# Patient Record
Sex: MALE | Race: WHITE | NOT HISPANIC OR LATINO | URBAN - METROPOLITAN AREA
[De-identification: names, ages, dates, MRNs, and addresses within clinical notes are randomized per-mention and may not be internally consistent; named-entity substitution may affect disease eponyms.]

---

## 2019-06-05 ENCOUNTER — CONSULT (OUTPATIENT)
Dept: PLASTIC SURGERY | Facility: HOSPITAL | Age: 74
End: 2019-06-05
Payer: MEDICARE

## 2019-06-05 VITALS — WEIGHT: 247.2 LBS | HEIGHT: 63 IN | BODY MASS INDEX: 43.8 KG/M2

## 2019-06-05 DIAGNOSIS — L81.8 ATYPICAL MELANOCYTIC HYPERPLASIA: Primary | ICD-10-CM

## 2019-06-05 PROCEDURE — 99204 OFFICE O/P NEW MOD 45 MIN: CPT | Performed by: PHYSICIAN ASSISTANT

## 2019-06-05 RX ORDER — ROSUVASTATIN CALCIUM 40 MG/1
40 TABLET, COATED ORAL DAILY
COMMUNITY

## 2019-06-05 RX ORDER — FUROSEMIDE 20 MG/1
20 TABLET ORAL 2 TIMES DAILY
COMMUNITY

## 2019-06-05 RX ORDER — CLOPIDOGREL BISULFATE 75 MG/1
75 TABLET ORAL DAILY
COMMUNITY

## 2019-06-05 RX ORDER — SPIRONOLACTONE 50 MG/1
50 TABLET, FILM COATED ORAL DAILY
COMMUNITY

## 2019-06-05 RX ORDER — RANOLAZINE 500 MG/1
500 TABLET, EXTENDED RELEASE ORAL 2 TIMES DAILY
COMMUNITY

## 2019-06-05 RX ORDER — METOPROLOL SUCCINATE 25 MG/1
25 TABLET, EXTENDED RELEASE ORAL 2 TIMES DAILY
COMMUNITY

## 2019-06-05 RX ORDER — CHOLECALCIFEROL (VITAMIN D3) 25 MCG
TABLET,CHEWABLE ORAL
COMMUNITY

## 2019-06-05 RX ORDER — PETROLATUM,WHITE/LANOLIN
OINTMENT (GRAM) TOPICAL
COMMUNITY

## 2019-06-07 PROBLEM — L81.8 OTHER SPECIFIED DISORDERS OF PIGMENTATION: Status: ACTIVE | Noted: 2019-06-07

## 2019-07-12 ENCOUNTER — APPOINTMENT (OUTPATIENT)
Dept: LAB | Facility: HOSPITAL | Age: 74
End: 2019-07-12
Attending: SURGERY
Payer: MEDICARE

## 2019-07-12 ENCOUNTER — HOSPITAL ENCOUNTER (OUTPATIENT)
Dept: NON INVASIVE DIAGNOSTICS | Facility: HOSPITAL | Age: 74
Discharge: HOME | End: 2019-07-12
Attending: SURGERY
Payer: MEDICARE

## 2019-07-12 DIAGNOSIS — L81.8 OTHER SPECIFIED DISORDERS OF PIGMENTATION: ICD-10-CM

## 2019-07-12 LAB
ANION GAP SERPL CALCULATED.3IONS-SCNC: 8 MMOL/L (ref 4–13)
ATRIAL RATE: 62 BPM
BASOPHILS # BLD AUTO: 0.05 THOUSANDS/ΜL (ref 0–0.1)
BASOPHILS NFR BLD AUTO: 1 % (ref 0–1)
BUN SERPL-MCNC: 14 MG/DL (ref 5–25)
CALCIUM SERPL-MCNC: 9.1 MG/DL (ref 8.3–10.1)
CHLORIDE SERPL-SCNC: 105 MMOL/L (ref 100–108)
CO2 SERPL-SCNC: 26 MMOL/L (ref 21–32)
CREAT SERPL-MCNC: 1.18 MG/DL (ref 0.6–1.3)
EOSINOPHIL # BLD AUTO: 0.27 THOUSAND/ΜL (ref 0–0.61)
EOSINOPHIL NFR BLD AUTO: 4 % (ref 0–6)
ERYTHROCYTE [DISTWIDTH] IN BLOOD BY AUTOMATED COUNT: 12.8 % (ref 11.6–15.1)
GFR SERPL CREATININE-BSD FRML MDRD: 61 ML/MIN/1.73SQ M
GLUCOSE P FAST SERPL-MCNC: 106 MG/DL (ref 65–99)
HCT VFR BLD AUTO: 41.7 % (ref 36.5–49.3)
HGB BLD-MCNC: 13.7 G/DL (ref 12–17)
IMM GRANULOCYTES # BLD AUTO: 0.04 THOUSAND/UL (ref 0–0.2)
IMM GRANULOCYTES NFR BLD AUTO: 1 % (ref 0–2)
LYMPHOCYTES # BLD AUTO: 1.87 THOUSANDS/ΜL (ref 0.6–4.47)
LYMPHOCYTES NFR BLD AUTO: 26 % (ref 14–44)
MCH RBC QN AUTO: 31.9 PG (ref 26.8–34.3)
MCHC RBC AUTO-ENTMCNC: 32.9 G/DL (ref 31.4–37.4)
MCV RBC AUTO: 97 FL (ref 82–98)
MONOCYTES # BLD AUTO: 0.76 THOUSAND/ΜL (ref 0.17–1.22)
MONOCYTES NFR BLD AUTO: 11 % (ref 4–12)
NEUTROPHILS # BLD AUTO: 4.27 THOUSANDS/ΜL (ref 1.85–7.62)
NEUTS SEG NFR BLD AUTO: 57 % (ref 43–75)
NRBC BLD AUTO-RTO: 0 /100 WBCS
P AXIS: 19 DEGREES
PLATELET # BLD AUTO: 194 THOUSANDS/UL (ref 149–390)
PMV BLD AUTO: 10.8 FL (ref 8.9–12.7)
POTASSIUM SERPL-SCNC: 4 MMOL/L (ref 3.5–5.3)
PR INTERVAL: 160 MS
QRS AXIS: 24 DEGREES
QRSD INTERVAL: 80 MS
QT INTERVAL: 432 MS
QTC INTERVAL: 438 MS
RBC # BLD AUTO: 4.3 MILLION/UL (ref 3.88–5.62)
SODIUM SERPL-SCNC: 139 MMOL/L (ref 136–145)
T WAVE AXIS: 35 DEGREES
VENTRICULAR RATE: 62 BPM
WBC # BLD AUTO: 7.26 THOUSAND/UL (ref 4.31–10.16)

## 2019-07-12 PROCEDURE — 93010 ELECTROCARDIOGRAM REPORT: CPT | Performed by: INTERNAL MEDICINE

## 2019-07-12 PROCEDURE — 36415 COLL VENOUS BLD VENIPUNCTURE: CPT

## 2019-07-12 PROCEDURE — 80048 BASIC METABOLIC PNL TOTAL CA: CPT

## 2019-07-12 PROCEDURE — 93005 ELECTROCARDIOGRAM TRACING: CPT

## 2019-07-12 PROCEDURE — 85025 COMPLETE CBC W/AUTO DIFF WBC: CPT

## 2019-07-15 PROCEDURE — NC001 PR NO CHARGE: Performed by: PHYSICIAN ASSISTANT

## 2019-07-15 NOTE — H&P
Assessment/Plan:  Garrett is a pleasant 73-year-old female who presents in consultation for an intra epidermal proliferation of atypical melanocytes of the right nose  She was referred to us by Dr Yemi Doll of [facility]  Please see HPI  The pathology report recommend excision with a margin of healthy tissue to rule out melanoma in situ  I discussed with her excision of the right nose atypical melanocytic lesion with possible flap, bilobed flap verses full-thickness skin graft  She understood and agreed  We discussed with the patient the options, benefits, and risks of surgery such as anesthesia, bleeding, infection, scarring, positive margin and the need for additional procedures  Consent was obtained and all questions answered to her satisfaction  We will plan for surgery at her earliest convenience       Diagnoses and all orders for this visit:     Atypical melanocytic hyperplasia            Subjective:       Patient ID: Makeda Mathur is a 69 y o  male      HPI   She reports having a skin lesion on the right side of her nose over the past several years  Over the last 2 years she has noticed it increased in size  She had the area biopsied by her dermatologist and discovered atypical cells  She denies any bleeding associated with it      The following portions of the patient's history were reviewed and updated as appropriate: He  has no past medical history on file  He  has no past surgical history on file  His family history is not on file  He  has no tobacco, alcohol, and drug history on file        Review of Systems   HENT: Negative for hearing loss  Eyes: Negative for visual disturbance  Respiratory: Negative for shortness of breath  Cardiovascular: Negative for chest pain  Gastrointestinal: Negative for abdominal pain, blood in stool, constipation, diarrhea, nausea and vomiting  Genitourinary: Negative for hematuria  Musculoskeletal: Negative for gait problem     Skin: As per HPI  Neurological: Negative for seizures and headaches  Hematological: Bruises/bleeds easily  Takes ASA and Plavix for cardiac stents  Psychiatric/Behavioral: The patient is not nervous/anxious            Objective:        There were no vitals taken for this visit             Physical Exam   Constitutional: He is oriented to person, place, and time  He appears well-developed and well-nourished  No distress  HENT:   Head: Normocephalic and atraumatic  Eyes: Pupils are equal, round, and reactive to light  EOM are normal  No scleral icterus  Neck: Neck supple  No tracheal deviation present  No thyromegaly present  Cardiovascular: Normal rate and regular rhythm  Exam reveals no gallop and no friction rub  No murmur heard  Pulmonary/Chest: Effort normal and breath sounds normal  He has no wheezes  He has no rales  Abdominal: Soft  Bowel sounds are normal  He exhibits no distension  There is no tenderness  There is no rebound and no guarding  Musculoskeletal: Normal range of motion  Lymphadenopathy:     He has no cervical adenopathy  Neurological: He is alert and oriented to person, place, and time  No cranial nerve deficit  Skin:   Right nose biopsy site noted  It measures approximately 5 mm round  It is located 2 5 cm from the right nasal ala are crease where it meets the nasolabial fold  It is also 4 cm from the right medial canthus  Please see photos  Psychiatric: He has a normal mood and affect

## 2019-07-16 RX ORDER — ISOSORBIDE MONONITRATE 60 MG/1
60 TABLET, EXTENDED RELEASE ORAL DAILY
COMMUNITY

## 2019-07-16 NOTE — PRE-PROCEDURE INSTRUCTIONS
Pre-Surgery Instructions:   Medication Instructions    BABY ASPIRIN PO Patient was instructed by Physician and understands   clopidogrel (PLAVIX) 75 mg tablet Patient was instructed by Physician and understands   Cyanocobalamin (B-12) 1000 MCG CAPS Patient was instructed by Physician and understands   Famotidine (PEPCID AC PO) Instructed patient per Anesthesia Guidelines   furosemide (LASIX) 20 mg tablet Instructed patient per Anesthesia Guidelines   Glucosamine Sulfate 1000 MG CAPS Patient was instructed by Physician and understands   isosorbide mononitrate (IMDUR) 60 mg 24 hr tablet Instructed patient per Anesthesia Guidelines   metoprolol succinate (TOPROL-XL) 25 mg 24 hr tablet Instructed patient per Anesthesia Guidelines   ranolazine (RANEXA) 500 mg 12 hr tablet Instructed patient per Anesthesia Guidelines   rosuvastatin (CRESTOR) 40 MG tablet Instructed patient per Anesthesia Guidelines   spironolactone (ALDACTONE) 50 mg tablet Instructed patient per Anesthesia Guidelines  Before your operation, you play an important role in decreasing your risk for infection by washing with special antiseptic soap  This is an effective way to reduce bacteria on the skin which may help to prevent infections at the surgical site  Please read the following directions in advance  1  In the week before your operation purchase a 4 ounce bottle of antiseptic soap containing chlorhexidine gluconate 4%  Some brand names include: Aplicare, Endure, and Hibiclens  The cost is usually less than $5 00  · For your convenience, the [facility] carries the soap  · It may also be available at your doctor's office or pre-admission testing center, and at most retail pharmacies  · If you are allergic or sensitive to soaps containing chlorhexidine gluconate (CHG), please let your doctor know so another antiseptic soap can be suggested  · CHG antiseptic soap is for external use only    2  The day before your operation follow these directions carefully to get ready  · Place clean lines (sheets) on your bed; you should sleep on clean sheets after your evening shower  · Get clean towels and washcloths ready - you need enough for 2 showers  · Set aside clean underwear, pajamas, and clothing to wear after the shower  Reminders:  · DO NOT use any other soap or body rinse on your skin during or after the antiseptic showers  · DO NOT use lotion , powder, deodorant, or perfume/aftershave of any kind on your skin after your antiseptic shower  · DO NOT shave any body parts in the 24 hours/the day before your operation  · DO NOT get the antiseptic soap in your eyes, ears, nose, mouth, or vaginal area  3      You will need to shower the night before AND the morning of your Surgery  Shower 1:  · The evening before your operation, take the fist shower  · First, shampoo your hair with regular shampoo and rinse it completely before you use the anitseptic soap  After washing and rinsing your hair, rinse your body  · Next, use a clean wash cloth to apply the antiseptic soap and wash your body from the neck down to your toes using 1/2 bottle of the antiseptic soap  You will use the other 1/2 bottle for the second shower  · Clean the area where your incision will be; later this area well for about 2 minutes  · If you ar having head or neck surgery, wash areas with the antiseptic soap  · Rinse yourself completely with running water  · Use a clean towel to dry off  · Wear clean underwear and clothing/pajamas  Shower 2:  · The Morning of your operation, take the second shower following the same steps as Shower 1 using the second 1/2 of the bottle of antiseptic soap  · Use clean cloths and towels to was and dry yourself off  · Wear clean underwear and clothing

## 2019-07-25 ENCOUNTER — ANESTHESIA (OUTPATIENT)
Dept: PERIOP | Facility: HOSPITAL | Age: 74
End: 2019-07-25
Payer: MEDICARE

## 2019-07-25 ENCOUNTER — ANESTHESIA EVENT (OUTPATIENT)
Dept: PERIOP | Facility: HOSPITAL | Age: 74
End: 2019-07-25
Payer: MEDICARE

## 2019-07-25 ENCOUNTER — HOSPITAL ENCOUNTER (OUTPATIENT)
Facility: HOSPITAL | Age: 74
Setting detail: OUTPATIENT SURGERY
Discharge: HOME | End: 2019-07-25
Attending: SURGERY | Admitting: SURGERY
Payer: MEDICARE

## 2019-07-25 VITALS
OXYGEN SATURATION: 96 % | RESPIRATION RATE: 16 BRPM | TEMPERATURE: 97.7 F | BODY MASS INDEX: 43.62 KG/M2 | WEIGHT: 246.2 LBS | HEART RATE: 70 BPM | SYSTOLIC BLOOD PRESSURE: 119 MMHG | DIASTOLIC BLOOD PRESSURE: 66 MMHG | HEIGHT: 63 IN

## 2019-07-25 DIAGNOSIS — L81.8 OTHER SPECIFIED DISORDERS OF PIGMENTATION: ICD-10-CM

## 2019-07-25 PROCEDURE — 88342 IMHCHEM/IMCYTCHM 1ST ANTB: CPT | Performed by: PATHOLOGY

## 2019-07-25 PROCEDURE — 11442 EXC FACE-MM B9+MARG 1.1-2 CM: CPT | Performed by: SURGERY

## 2019-07-25 PROCEDURE — 15260 FTH/GFT FR N/E/E/L 20 SQCM/<: CPT | Performed by: SURGERY

## 2019-07-25 PROCEDURE — 88305 TISSUE EXAM BY PATHOLOGIST: CPT | Performed by: PATHOLOGY

## 2019-07-25 PROCEDURE — 15260 FTH/GFT FR N/E/E/L 20 SQCM/<: CPT | Performed by: PHYSICIAN ASSISTANT

## 2019-07-25 PROCEDURE — 11442 EXC FACE-MM B9+MARG 1.1-2 CM: CPT | Performed by: PHYSICIAN ASSISTANT

## 2019-07-25 RX ORDER — EPHEDRINE SULFATE 50 MG/ML
INJECTION INTRAVENOUS AS NEEDED
Status: DISCONTINUED | OUTPATIENT
Start: 2019-07-25 | End: 2019-07-25 | Stop reason: SURG

## 2019-07-25 RX ORDER — CEFAZOLIN SODIUM 2 G/50ML
2000 SOLUTION INTRAVENOUS ONCE
Status: DISCONTINUED | OUTPATIENT
Start: 2019-07-25 | End: 2019-07-25 | Stop reason: HOSPADM

## 2019-07-25 RX ORDER — SODIUM CHLORIDE, SODIUM LACTATE, POTASSIUM CHLORIDE, CALCIUM CHLORIDE 600; 310; 30; 20 MG/100ML; MG/100ML; MG/100ML; MG/100ML
125 INJECTION, SOLUTION INTRAVENOUS CONTINUOUS
Status: DISCONTINUED | OUTPATIENT
Start: 2019-07-25 | End: 2019-07-25 | Stop reason: HOSPADM

## 2019-07-25 RX ORDER — ONDANSETRON 2 MG/ML
4 INJECTION INTRAMUSCULAR; INTRAVENOUS ONCE
Status: DISCONTINUED | OUTPATIENT
Start: 2019-07-25 | End: 2019-07-25 | Stop reason: HOSPADM

## 2019-07-25 RX ORDER — FENTANYL CITRATE/PF 50 MCG/ML
25 SYRINGE (ML) INJECTION
Status: DISCONTINUED | OUTPATIENT
Start: 2019-07-25 | End: 2019-07-25 | Stop reason: HOSPADM

## 2019-07-25 RX ORDER — CEFAZOLIN SODIUM 2 G/50ML
SOLUTION INTRAVENOUS AS NEEDED
Status: DISCONTINUED | OUTPATIENT
Start: 2019-07-25 | End: 2019-07-25 | Stop reason: SURG

## 2019-07-25 RX ORDER — FENTANYL CITRATE 50 UG/ML
INJECTION, SOLUTION INTRAMUSCULAR; INTRAVENOUS AS NEEDED
Status: DISCONTINUED | OUTPATIENT
Start: 2019-07-25 | End: 2019-07-25 | Stop reason: SURG

## 2019-07-25 RX ORDER — DEXAMETHASONE SODIUM PHOSPHATE 4 MG/ML
4 INJECTION, SOLUTION INTRA-ARTICULAR; INTRALESIONAL; INTRAMUSCULAR; INTRAVENOUS; SOFT TISSUE ONCE
Status: DISCONTINUED | OUTPATIENT
Start: 2019-07-25 | End: 2019-07-25 | Stop reason: HOSPADM

## 2019-07-25 RX ORDER — MIDAZOLAM HYDROCHLORIDE 1 MG/ML
INJECTION INTRAMUSCULAR; INTRAVENOUS AS NEEDED
Status: DISCONTINUED | OUTPATIENT
Start: 2019-07-25 | End: 2019-07-25 | Stop reason: SURG

## 2019-07-25 RX ORDER — ONDANSETRON 2 MG/ML
INJECTION INTRAMUSCULAR; INTRAVENOUS AS NEEDED
Status: DISCONTINUED | OUTPATIENT
Start: 2019-07-25 | End: 2019-07-25 | Stop reason: SURG

## 2019-07-25 RX ORDER — PROPOFOL 10 MG/ML
INJECTION, EMULSION INTRAVENOUS AS NEEDED
Status: DISCONTINUED | OUTPATIENT
Start: 2019-07-25 | End: 2019-07-25 | Stop reason: SURG

## 2019-07-25 RX ORDER — GLYCOPYRROLATE 0.2 MG/ML
INJECTION INTRAMUSCULAR; INTRAVENOUS AS NEEDED
Status: DISCONTINUED | OUTPATIENT
Start: 2019-07-25 | End: 2019-07-25 | Stop reason: SURG

## 2019-07-25 RX ADMIN — SODIUM CHLORIDE, SODIUM LACTATE, POTASSIUM CHLORIDE, AND CALCIUM CHLORIDE: .6; .31; .03; .02 INJECTION, SOLUTION INTRAVENOUS at 08:04

## 2019-07-25 RX ADMIN — EPHEDRINE SULFATE 10 MG: 50 INJECTION, SOLUTION INTRAVENOUS at 08:07

## 2019-07-25 RX ADMIN — FENTANYL CITRATE 50 MCG: 50 INJECTION, SOLUTION INTRAMUSCULAR; INTRAVENOUS at 07:23

## 2019-07-25 RX ADMIN — ONDANSETRON 4 MG: 2 INJECTION INTRAMUSCULAR; INTRAVENOUS at 07:58

## 2019-07-25 RX ADMIN — FENTANYL CITRATE 50 MCG: 50 INJECTION, SOLUTION INTRAMUSCULAR; INTRAVENOUS at 07:46

## 2019-07-25 RX ADMIN — SODIUM CHLORIDE, SODIUM LACTATE, POTASSIUM CHLORIDE, AND CALCIUM CHLORIDE: .6; .31; .03; .02 INJECTION, SOLUTION INTRAVENOUS at 07:26

## 2019-07-25 RX ADMIN — CEFAZOLIN SODIUM 2000 MG: 2 SOLUTION INTRAVENOUS at 07:45

## 2019-07-25 RX ADMIN — PHENYLEPHRINE HYDROCHLORIDE 50 MCG: 10 INJECTION INTRAVENOUS at 07:40

## 2019-07-25 RX ADMIN — SODIUM CHLORIDE, SODIUM LACTATE, POTASSIUM CHLORIDE, AND CALCIUM CHLORIDE 125 ML/HR: .6; .31; .03; .02 INJECTION, SOLUTION INTRAVENOUS at 06:38

## 2019-07-25 RX ADMIN — GLYCOPYRROLATE 8 MG: 0.2 INJECTION, SOLUTION INTRAMUSCULAR; INTRAVENOUS at 07:51

## 2019-07-25 RX ADMIN — GLYCOPYRROLATE 0.2 MG: 0.2 INJECTION, SOLUTION INTRAMUSCULAR; INTRAVENOUS at 07:25

## 2019-07-25 RX ADMIN — EPHEDRINE SULFATE 10 MG: 50 INJECTION, SOLUTION INTRAVENOUS at 07:33

## 2019-07-25 RX ADMIN — MIDAZOLAM 2 MG: 1 INJECTION INTRAMUSCULAR; INTRAVENOUS at 07:22

## 2019-07-25 RX ADMIN — EPHEDRINE SULFATE 10 MG: 50 INJECTION, SOLUTION INTRAVENOUS at 07:38

## 2019-07-25 RX ADMIN — EPHEDRINE SULFATE 10 MG: 50 INJECTION, SOLUTION INTRAVENOUS at 07:57

## 2019-07-25 RX ADMIN — PHENYLEPHRINE HYDROCHLORIDE 100 MCG: 10 INJECTION INTRAVENOUS at 07:49

## 2019-07-25 RX ADMIN — PROPOFOL 200 MG: 10 INJECTION, EMULSION INTRAVENOUS at 07:32

## 2019-07-25 RX ADMIN — PHENYLEPHRINE HYDROCHLORIDE 50 MCG: 10 INJECTION INTRAVENOUS at 08:06

## 2019-07-25 NOTE — DISCHARGE INSTRUCTIONS
[facility]  Dr Nabil Segura   [address] [facility] [address], [address]  Phone: [phone]     Postoperative Instructions for Outpatient Surgery     These instructions are being provided by your doctor to give you basic guidelines during your post-op recovery  Please let our office know if your contact information has changed       Please call the office today for an appointment on Monday 7/29 for postoperative care      Dressings: Steri strips on neck, replace if they fall off  Keep nose dressing clean and dry       Activity Restrictions: Nothing strenuous for 48 hours       Bathing:  May shower tomorrow afternoon but, keep nose dressing dry  Pat neck incision dry after washing       Medications:    Resume pre-op medications  You may take tylenol, aleve, or ibuprofen for pain control                 Other: Elevate head of bed at night to sleep over the next 48 hours  May apply ice to neck at 15 minute intervals as needed for pain or swelling

## 2019-07-25 NOTE — ANESTHESIA POSTPROCEDURE EVALUATION
Post-Op Assessment Note    CV Status:  Stable  Pain Score: 0    Pain management: adequate     Mental Status:  Alert   Hydration Status:  Stable   PONV Controlled:  None   Airway Patency:  Patent    Staff: Anesthesiologist, with CRNAs           BP      Temp      Pulse     Resp      SpO2

## 2019-07-25 NOTE — INTERIM OP NOTE
EXCISION ATYPICAL MELANOCYTIC LESION RIGHT NOSE, SKIN GRAFT FULL THICKNESS  (FTSG) HEAD/NECK  Postoperative Note  PATIENT NAME: Deepa Willson  : 1945  MRN: [id number]  [facility] RO[facility] 02    Surgery Date: 2019    Preop Diagnosis:  Other specified disorders of pigmentation [L81 8]    Post-Op Diagnosis Codes:     * Other specified disorders of pigmentation [L81 8]    Procedure(s) (LRB):  EXCISION ATYPICAL MELANOCYTIC LESION RIGHT NOSE (Right)  SKIN GRAFT FULL THICKNESS  (FTSG) HEAD/NECK (Right)    Surgeon(s) and Role:     Mitra Singh MD - Primary     * Kole Mcpherson PA-C - Assisting    Specimens:  ID Type Source Tests Collected by Time Destination   1 : right nose atypical melanocytic lesion long suture 1200superior margin, short suture 600 inferior margin Tissue Lesion TISSUE Kin Schmid MD 2019 [id number]        Estimated Blood Loss:   Minimal    Anesthesia Type:   General/LMA     Findings:    None  Complications:   None    SIGNATURE: Kole Mcpherson PA-C   DATE: 2019   TIME: 8:18 AM

## 2019-07-25 NOTE — ANESTHESIA PREPROCEDURE EVALUATION
Review of Systems/Medical History  Patient summary reviewed  Chart reviewed  History of anesthetic complications PONV    Cardiovascular  EKG reviewed, Negative cardio ROS Hyperlipidemia, Hypertension controlled, Past MI , CAD , Cardiac stents  Angina ,    Pulmonary  Negative pulmonary ROS Smoker ex-smoker  , Sleep apnea CPAP,        GI/Hepatic  Negative GI/hepatic ROS   GERD well controlled,        Negative  ROS        Endo/Other  Negative endo/other ROS Diabetes Diet controlled,   Obesity  morbid obesity   GYN  Negative gynecology ROS          Hematology  Negative hematology ROS      Musculoskeletal  Negative musculoskeletal ROS        Neurology  Negative neurology ROS      Psychology   Negative psychology ROS Anxiety,              Physical Exam    Airway    Mallampati score: II  TM Distance: >3 FB  Neck ROM: full     Dental   No notable dental hx     Cardiovascular  Comment: Negative ROS, Rhythm: regular, Rate: normal, Cardiovascular exam normal    Pulmonary  Pulmonary exam normal Breath sounds clear to auscultation,     Other Findings        Anesthesia Plan  ASA Score- 3     Anesthesia Type- general with ASA Monitors  Additional Monitors:   Airway Plan: LMA  Plan Factors-    Induction- intravenous  Postoperative Plan-     Informed Consent- Anesthetic plan and risks discussed with patient  I personally reviewed this patient with the CRNA  Discussed and agreed on the Anesthesia Plan with the CRNA  Dany Pichardo

## 2019-07-29 ENCOUNTER — OFFICE VISIT (OUTPATIENT)
Dept: PLASTIC SURGERY | Facility: CLINIC | Age: 74
End: 2019-07-29

## 2019-07-29 VITALS — WEIGHT: 245 LBS | BODY MASS INDEX: 41.83 KG/M2 | HEIGHT: 64 IN

## 2019-07-29 DIAGNOSIS — Z98.890 POST-OPERATIVE STATE: Primary | ICD-10-CM

## 2019-07-29 PROCEDURE — 99024 POSTOP FOLLOW-UP VISIT: CPT | Performed by: SURGERY

## 2019-07-29 NOTE — PATIENT INSTRUCTIONS
Patient to follow up in 1 week  Patient instructed to do wound care daily  apply bacitracin and Aquaphor  Patient may shower and wash around

## 2019-07-29 NOTE — OP NOTE
OPERATIVE REPORT  PATIENT NAME: Madison Oneal    :  1945  MRN: [id number]  Pt Location: [facility] 02    SURGERY DATE: 2019    Surgeon(s) and Role:     * Abhijit Rodrigues MD - Primary     * Pia Rivas PA-C - Assisting    Preop Diagnosis:  Other specified disorders of pigmentation [L81 8]    Post-Op Diagnosis Codes:     * Other specified disorders of pigmentation [L81 8]    Procedure(s) (LRB):  EXCISION ATYPICAL MELANOCYTIC LESION RIGHT NOSE (Right)  SKIN GRAFT FULL THICKNESS  (FTSG) HEAD/NECK (Right) 1 1 cm excision, 1 5 x 1 5 cm full-thickness skin graft    Specimen(s):  ID Type Source Tests Collected by Time Destination   1 : right nose atypical melanocytic lesion long suture 1200superior margin, short suture 600 inferior margin Tissue Lesion TISSUE Alfred Harris MD 2019 0751        Estimated Blood Loss:   Minimal    Drains:  * No LDAs found *    Anesthesia Type:   General/LMA    Operative Indications:   atypical melanocytic proliferation    Operative Findings:   as above    Complications:   None    Procedure and Technique:   Aliya Kamara was seen in the holding area preoperatively, the surgical site was marked with her participation, and we reviewed the planned procedure as well as potential risks, complications, and limitations  She was taken to the operating room and underwent induction of anesthesia by the anesthesia personnel  A proper time-out was performed  2 5 loupe magnification was used to aid in visualization  The area was marked to be excised with a margin of normal-appearing skin and it was infiltrated with xylocaine with epinephrine  A 15 blade used to create skin incision is carried down through the dermis and lesion was excised in the plane of subcutaneous fat utilizing the 15 blade  The specimen was marked with sutures and sent to pathology  Hemostasis was assured the Bovie    An appropriately sized full-thickness skin graft was then harvested from the right neck utilizing a 15 blade  The graft was defatted on the back table and trimmed to fit the defect  It was inset with 6 0 chromic sutures and the graft was then protected sterile foam coated in bacitracin  This was secured with 5 0 silk bolster type sutures  The donor site was closed with 5 O Monocryl buried at the level of the dermis this was followed by running subcuticular 5 O Monocryl  Benzoin and Steri-Strips were then applied  The patient was transferred to the recovery room     I was present for the entire procedure    Patient Disposition:  PACU     SIGNATURE: Abhijit Rodrigues MD  DATE: July 29, 2019  TIME: 2:04 PM

## 2019-07-29 NOTE — PROGRESS NOTES
Deacon Simmons is a 69 y o  Female status post EXCISION ATYPICAL MELANOCYTIC LESION RIGHT NOSE (Right)  SKIN GRAFT FULL THICKNESS  (FTSG) HEAD/NECK (Right) 1 1 cm excision, 1 5 x 1 5 cm full-thickness skin graft done on 7/25/19  The patient is in the office today for bolster removal  Bolster removed  Bacitracin and Aquaphor applied to the area  Photographs taken and the patient will follow up  In one week

## 2019-08-02 ENCOUNTER — OFFICE VISIT (OUTPATIENT)
Dept: PLASTIC SURGERY | Facility: CLINIC | Age: 74
End: 2019-08-02

## 2019-08-02 DIAGNOSIS — L81.8 ATYPICAL MELANOCYTIC HYPERPLASIA: Primary | ICD-10-CM

## 2019-08-02 PROCEDURE — 99024 POSTOP FOLLOW-UP VISIT: CPT | Performed by: PHYSICIAN ASSISTANT

## 2019-08-02 NOTE — PROGRESS NOTES
Assessment/Plan:   Aliya Kamara is a pleasant 81-year-old female who is 8 days status post excision of an atypical melanocytic lesion from the nose with full-thickness skin graft  Please see HPI  She is originally referred to us by Dr Chen  Pathology is not finalized as of yet  I have given her instructions on how to use silicone scar gel and to avoid sun exposure  She will use just Aquaphor to the graft site  We will see her back in approximately 2 to 4 weeks or sooner with any concerns  Diagnoses and all orders for this visit:    Atypical melanocytic hyperplasia          Subjective:     Patient ID: Madison Oneal is a 69 y o  male  HPI   She complains of itchiness mostly at the donor site  Review of Systems   Skin:        As per HPI  Objective:     Physical Exam   Skin:   Graft site is healing well  Chromic sutures removed  Donor site incision is clean, dry and intact  Sutures were removed

## 2019-08-02 NOTE — LETTER
August 2, 2019     Brigid Nolen DO  [address] [facility],[address]  [address] [place] [address]    Patient: Michelle Childress   YOB: 1945   Date of Visit: 8/2/2019       Dear Dr Jim Alonso: Thank you for referring Kirk Gravely to me for evaluation  Below are my notes for this consultation  If you have questions, please do not hesitate to call me  I look forward to following your patient along with you  Sincerely,        Rani Cervantes PA-C        CC: MD Rani Jones PA-C  8/2/2019 10:52 AM  Sign at close encounter  Assessment/Plan:   Farhat Mandel is a pleasant 19-year-old female who is 8 days status post excision of an atypical melanocytic lesion from the nose with full-thickness skin graft  Please see HPI  She is originally referred to us by Dr Chen  Pathology is not finalized as of yet  I have given her instructions on how to use silicone scar gel and to avoid sun exposure  She will use just Aquaphor to the graft site  We will see her back in approximately 2 to 4 weeks or sooner with any concerns  Diagnoses and all orders for this visit:    Atypical melanocytic hyperplasia          Subjective:     Patient ID: Michelle Childress is a 69 y o  male  HPI   She complains of itchiness mostly at the donor site  Review of Systems   Skin:        As per HPI  Objective:     Physical Exam   Skin:   Graft site is healing well  Chromic sutures removed  Donor site incision is clean, dry and intact  Sutures were removed

## 2019-08-30 ENCOUNTER — OFFICE VISIT (OUTPATIENT)
Dept: PLASTIC SURGERY | Facility: CLINIC | Age: 74
End: 2019-08-30

## 2019-08-30 DIAGNOSIS — L81.9 ATYPICAL PIGMENTED LESION: Primary | ICD-10-CM

## 2019-08-30 PROCEDURE — 99024 POSTOP FOLLOW-UP VISIT: CPT | Performed by: PHYSICIAN ASSISTANT

## 2019-08-30 NOTE — PROGRESS NOTES
Assessment/Plan:   Brittany Garzon is a pleasant 70-year-old female who is 4 weeks status post excision of an atypical melanocytic lesion from the nose with full-thickness skin graft  Please see HPI  She is originally referred to us by Dr Chen  Path report reveals Atypical intraepidermal melanocytic proliferation  This was reviewed with patient and all questions answered  I did discuss this case with Dr Cordelia Newman of pathology who feels the 6 O'clock area should be re-biopsied at some point after the graft is well healed  Patient may begin scar gel to the nose and avoid sun exposure  Follow up in 2 months or sooner with concerns  Diagnoses and all orders for this visit:    Atypical pigmented lesion          Subjective:     Patient ID: Seema Stephens is a 69 y o  male  HPI   She denies complaints regarding her scars  She is using scar gel on her donor site  Review of Systems   Skin:        As per HPI  Objective:     Physical Exam   Skin:   Nose graft is healing well  Remaining chromic sutures removed  Please see photo

## 2019-08-30 NOTE — LETTER
August 30, 2019     Selene Masterson DO  [address] [facility]  [address]    Patient: Erika Rueda   YOB: 1945   Date of Visit: 8/30/2019       Dear Dr Gerardo Level: Thank you for referring Zay Dee to me for evaluation  Below are my notes for this consultation  If you have questions, please do not hesitate to call me  I look forward to following your patient along with you  Sincerely,        Venancio Garcia PA-C        CC: MD Venancio Boyer PA-C  8/30/2019 11:37 AM  Sign at close encounter  Assessment/Plan:   Billy Tsang is a pleasant 65-year-old female who is 4 weeks status post excision of an atypical melanocytic lesion from the nose with full-thickness skin graft  Please see HPI  She is originally referred to us by Dr Chen  Path report reveals Atypical intraepidermal melanocytic proliferation  This was reviewed with patient and all questions answered  I did discuss this case with Dr Miriam Coelho of pathology who feels the 6 O'clock area should be re-biopsied at some point after the graft is well healed  Patient may begin scar gel to the nose and avoid sun exposure  Follow up in 2 months or sooner with concerns  Diagnoses and all orders for this visit:    Atypical pigmented lesion          Subjective:     Patient ID: Erika Rueda is a 69 y o  male  HPI   She denies complaints regarding her scars  She is using scar gel on her donor site  Review of Systems   Skin:        As per HPI  Objective:     Physical Exam   Skin:   Nose graft is healing well  Remaining chromic sutures removed  Please see photo

## 2019-11-12 ENCOUNTER — OFFICE VISIT (OUTPATIENT)
Dept: PLASTIC SURGERY | Facility: CLINIC | Age: 74
End: 2019-11-12
Payer: MEDICARE

## 2019-11-12 DIAGNOSIS — L81.9 ATYPICAL PIGMENTED SKIN LESION: Primary | ICD-10-CM

## 2019-11-12 PROCEDURE — 99214 OFFICE O/P EST MOD 30 MIN: CPT | Performed by: PHYSICIAN ASSISTANT

## 2019-11-12 PROCEDURE — 1123F ACP DISCUSS/DSCN MKR DOCD: CPT | Performed by: PATHOLOGY

## 2019-11-12 NOTE — H&P (VIEW-ONLY)
Assessment/Plan:   Judith Fox is a pleasant 69year-old female who is 3 months status post excision of an atypical melanocytic lesion from the nose with full-thickness skin graft   Please see HPI  Johnny Lenz is originally referred to us by Dr Chen  Path report revealed Atypical intraepidermal melanocytic proliferation  The path was previously discussed with Dr Laura Mckenzie of pathology who felt the 6 O'clock area should be re-biopsied at some point after the graft is well healed  I discussed with her re-excision of the 6 O'clock margin with complex closure  She understood, agreed and would prefer local anesthesia for this  We discussed with the patient the options, benefits, and risks of surgery such as anesthesia, bleeding, infection, scarring and the need for additional procedures  Consent was obtained and all questions answered to her satisfaction  We will plan for surgery at her earliest convenience  Diagnoses and all orders for this visit:    Atypical pigmented skin lesion          Subjective:     Patient ID: Rose Headley is a 69 y o  male  HPI   She denies any complaints regarding her graft site  Review of Systems   HENT: Negative for hearing loss  Eyes: Negative for visual disturbance  Respiratory: Negative for shortness of breath  Cardiovascular: Negative for chest pain  Gastrointestinal: Negative for abdominal pain, blood in stool, constipation, diarrhea, nausea and vomiting  Genitourinary: Negative for hematuria  Musculoskeletal: Negative for gait problem  Skin:        As per HPI  Neurological: Negative for seizures and headaches  Hematological: Does not bruise/bleed easily  Psychiatric/Behavioral: The patient is not nervous/anxious  Objective:     Physical Exam   Constitutional: He is oriented to person, place, and time  He appears well-developed and well-nourished  No distress  HENT:   Head: Normocephalic and atraumatic     Eyes: Pupils are equal, round, and reactive to light  EOM are normal  No scleral icterus  Neck: Neck supple  No tracheal deviation present  No thyromegaly present  Cardiovascular: Normal rate and regular rhythm  Exam reveals no gallop and no friction rub  No murmur heard  Pulmonary/Chest: Effort normal and breath sounds normal  He has no wheezes  He has no rales  Abdominal: Soft  Bowel sounds are normal  He exhibits no distension  There is no tenderness  There is no rebound and no guarding  Musculoskeletal: Normal range of motion  Lymphadenopathy:     He has no cervical adenopathy  Neurological: He is alert and oriented to person, place, and time  No cranial nerve deficit  Skin:   Right nose collapse site is tan in appearance  It is otherwise well healed  Please see photo  Psychiatric: He has a normal mood and affect

## 2019-11-12 NOTE — PROGRESS NOTES
Assessment/Plan:   Ham Velazquez is a pleasant 69year-old female who is 3 months status post excision of an atypical melanocytic lesion from the nose with full-thickness skin graft   Please see HPI  Junior Pimentel is originally referred to us by Dr Chen  Path report revealed Atypical intraepidermal melanocytic proliferation  The path was previously discussed with Dr Caroline Jiménez of pathology who felt the 6 O'clock area should be re-biopsied at some point after the graft is well healed  I discussed with her re-excision of the 6 O'clock margin with complex closure  She understood, agreed and would prefer local anesthesia for this  We discussed with the patient the options, benefits, and risks of surgery such as anesthesia, bleeding, infection, scarring and the need for additional procedures  Consent was obtained and all questions answered to her satisfaction  We will plan for surgery at her earliest convenience  Diagnoses and all orders for this visit:    Atypical pigmented skin lesion          Subjective:     Patient ID: Raquel Dunn is a 69 y o  male  HPI   She denies any complaints regarding her graft site  Review of Systems   HENT: Negative for hearing loss  Eyes: Negative for visual disturbance  Respiratory: Negative for shortness of breath  Cardiovascular: Negative for chest pain  Gastrointestinal: Negative for abdominal pain, blood in stool, constipation, diarrhea, nausea and vomiting  Genitourinary: Negative for hematuria  Musculoskeletal: Negative for gait problem  Skin:        As per HPI  Neurological: Negative for seizures and headaches  Hematological: Does not bruise/bleed easily  Psychiatric/Behavioral: The patient is not nervous/anxious  Objective:     Physical Exam   Constitutional: He is oriented to person, place, and time  He appears well-developed and well-nourished  No distress  HENT:   Head: Normocephalic and atraumatic     Eyes: Pupils are equal, round, and reactive to light  EOM are normal  No scleral icterus  Neck: Neck supple  No tracheal deviation present  No thyromegaly present  Cardiovascular: Normal rate and regular rhythm  Exam reveals no gallop and no friction rub  No murmur heard  Pulmonary/Chest: Effort normal and breath sounds normal  He has no wheezes  He has no rales  Abdominal: Soft  Bowel sounds are normal  He exhibits no distension  There is no tenderness  There is no rebound and no guarding  Musculoskeletal: Normal range of motion  Lymphadenopathy:     He has no cervical adenopathy  Neurological: He is alert and oriented to person, place, and time  No cranial nerve deficit  Skin:   Right nose collapse site is tan in appearance  It is otherwise well healed  Please see photo  Psychiatric: He has a normal mood and affect

## 2019-11-12 NOTE — LETTER
November 12, 2019     Maria Acoleman Varner DO  [address] [facility]  [address]    Patient: Germaine Cabrera   YOB: 1945   Date of Visit: 11/12/2019       Dear Dr Neli Chandra: Thank you for referring Pastora Lay to me for evaluation  Below are my notes for this consultation  If you have questions, please do not hesitate to call me  I look forward to following your patient along with you  Sincerely,        Cristóbal Orellana PA-C        CC: No Recipients  Misty Uribe  11/12/2019  9:23 AM  Sign at close encounter  Assessment/Plan:   Charla Green is a pleasant 69year-old female who is 4 weeks status post excision of an atypical melanocytic lesion from the nose with full-thickness skin graft   Please see HPI  Juan Kolb is originally referred to us by Dr Chen  Path report revealed Atypical intraepidermal melanocytic proliferation  The path was previously discussed with Dr Juana Castillo of pathology who felt the 6 O'clock area should be re-biopsied at some point after the graft is well healed  I discussed with her re-excision of the 6 O'clock margin with complex closure  She understood, agreed and would prefer local anesthesia for this  We discussed with the patient the options, benefits, and risks of surgery such as anesthesia, bleeding, infection, scarring and the need for additional procedures  Consent was obtained and all questions answered to her satisfaction  We will plan for surgery at her earliest convenience  Diagnoses and all orders for this visit:    Atypical pigmented skin lesion          Subjective:     Patient ID: Germaine Cabrera is a 69 y o  male  HPI   She denies any complaints regarding her graft site  Review of Systems   HENT: Negative for hearing loss  Eyes: Negative for visual disturbance  Respiratory: Negative for shortness of breath  Cardiovascular: Negative for chest pain     Gastrointestinal: Negative for abdominal pain, blood in stool, constipation, diarrhea, nausea and vomiting  Genitourinary: Negative for hematuria  Musculoskeletal: Negative for gait problem  Skin:        As per HPI  Neurological: Negative for seizures and headaches  Hematological: Does not bruise/bleed easily  Psychiatric/Behavioral: The patient is not nervous/anxious  Objective:     Physical Exam   Constitutional: He is oriented to person, place, and time  He appears well-developed and well-nourished  No distress  HENT:   Head: Normocephalic and atraumatic  Eyes: Pupils are equal, round, and reactive to light  EOM are normal  No scleral icterus  Neck: Neck supple  No tracheal deviation present  No thyromegaly present  Cardiovascular: Normal rate and regular rhythm  Exam reveals no gallop and no friction rub  No murmur heard  Pulmonary/Chest: Effort normal and breath sounds normal  He has no wheezes  He has no rales  Abdominal: Soft  Bowel sounds are normal  He exhibits no distension  There is no tenderness  There is no rebound and no guarding  Musculoskeletal: Normal range of motion  Lymphadenopathy:     He has no cervical adenopathy  Neurological: He is alert and oriented to person, place, and time  No cranial nerve deficit  Skin:   Right nose collapse site is tan in appearance  It is otherwise well healed  Please see photo  Psychiatric: He has a normal mood and affect

## 2019-12-02 NOTE — PRE-PROCEDURE INSTRUCTIONS
Pre-Surgery Instructions:   Medication Instructions    BABY ASPIRIN PO Instructed patient per Anesthesia Guidelines   clopidogrel (PLAVIX) 75 mg tablet Patient was instructed by Physician and understands   Cyanocobalamin (B-12) 1000 MCG CAPS Patient was instructed by Physician and understands   Famotidine (PEPCID AC PO) Patient was instructed by Physician and understands   furosemide (LASIX) 20 mg tablet Patient was instructed by Physician and understands   Glucosamine Sulfate 1000 MG CAPS Patient was instructed by Physician and understands   isosorbide mononitrate (IMDUR) 60 mg 24 hr tablet Patient was instructed by Physician and understands   metoprolol succinate (TOPROL-XL) 25 mg 24 hr tablet Patient was instructed by Physician and understands   ranolazine (RANEXA) 500 mg 12 hr tablet Patient was instructed by Physician and understands   rosuvastatin (CRESTOR) 40 MG tablet Patient was instructed by Physician and understands   spironolactone (ALDACTONE) 50 mg tablet Patient was instructed by Physician and understands  Pre shower with hibiclens as instructed by surgeon  You may drive yourself to the hospital   You may take your medications day of surgery  You may eat on the day of your surgery  You may have a dressing, please wear something that will fit over it  Before your operation, you play an important role in decreasing your risk for infection by washing with special antiseptic soap  This is an effective way to reduce bacteria on the skin which may help to prevent infections at the surgical site  Please read the following directions in advance  1  In the week before your operation purchase a 4 ounce bottle of antiseptic soap containing chlorhexidine gluconate 4%  Some brand names include: Aplicare, Endure, and Hibiclens  The cost is usually less than $5 00  · For your convenience, the [address] [place] [address] carries the soap    · It may also be available at your doctor's office or pre-admission testing center, and at [facility]  · If you are allergic or sensitive to soaps containing chlorhexidine gluconate (CHG), please let your doctor know so another antiseptic soap can be suggested  · CHG antiseptic soap is for external use only  2      The day before your operation follow these directions carefully to get ready  · Place clean lines (sheets) on your bed; you should sleep on clean sheets after your evening shower  · Get clean towels and washcloths ready - you need enough for 2 showers  · Set aside clean underwear, pajamas, and clothing to wear after the shower  Reminders:  · DO NOT use any other soap or body rinse on your skin during or after the antiseptic showers  · DO NOT use lotion , powder, deodorant, or perfume/aftershave of any kind on your skin after your antiseptic shower  · DO NOT shave any body parts in the 24 hours/the day before your operation  · DO NOT get the antiseptic soap in your eyes, ears, nose, mouth, or vaginal area  3      You will need to shower the night before AND the morning of your Surgery  Shower 1:  · The evening before your operation, take the fist shower  · First, shampoo your hair with regular shampoo and rinse it completely before you use the anitseptic soap  After washing and rinsing your hair, rinse your body  · Next, use a clean wash cloth to apply the antiseptic soap and wash your body from the neck down to your toes using 1/2 bottle of the antiseptic soap  You will use the other 1/2 bottle for the second shower  · Clean the area where your incision will be; later this area well for about 2 minutes  · If you ar having head or neck surgery, wash areas with the antiseptic soap  · Rinse yourself completely with running water  · Use a clean towel to dry off  · Wear clean underwear and clothing/pajamas    Shower 2:  · The Morning of your operation, take the second shower following the same steps as Shower 1 using the second 1/2 of the bottle of antiseptic soap  · Use clean cloths and towels to was and dry yourself off  · Wear clean underwear and clothing

## 2019-12-04 PROBLEM — L81.9 ATYPICAL PIGMENTED SKIN LESION: Status: ACTIVE | Noted: 2019-11-12

## 2019-12-05 ENCOUNTER — HOSPITAL ENCOUNTER (OUTPATIENT)
Facility: HOSPITAL | Age: 74
Setting detail: OUTPATIENT SURGERY
Discharge: HOME | End: 2019-12-05
Attending: SURGERY | Admitting: SURGERY
Payer: MEDICARE

## 2019-12-05 ENCOUNTER — TELEPHONE (OUTPATIENT)
Dept: PLASTIC SURGERY | Facility: CLINIC | Age: 74
End: 2019-12-05

## 2019-12-05 VITALS
SYSTOLIC BLOOD PRESSURE: 128 MMHG | OXYGEN SATURATION: 95 % | DIASTOLIC BLOOD PRESSURE: 61 MMHG | RESPIRATION RATE: 18 BRPM | HEART RATE: 64 BPM | TEMPERATURE: 99.2 F

## 2019-12-05 DIAGNOSIS — L81.9 DISORDER OF PIGMENTATION, UNSPECIFIED: ICD-10-CM

## 2019-12-05 DIAGNOSIS — L81.9 ATYPICAL PIGMENTED SKIN LESION: ICD-10-CM

## 2019-12-05 PROCEDURE — 88305 TISSUE EXAM BY PATHOLOGIST: CPT | Performed by: PATHOLOGY

## 2019-12-05 PROCEDURE — 14060 TIS TRNFR E/N/E/L 10 SQ CM/<: CPT | Performed by: SURGERY

## 2019-12-05 RX ORDER — LIDOCAINE HYDROCHLORIDE AND EPINEPHRINE 10; 10 MG/ML; UG/ML
20 INJECTION, SOLUTION INFILTRATION; PERINEURAL ONCE
Status: DISCONTINUED | OUTPATIENT
Start: 2019-12-05 | End: 2019-12-05 | Stop reason: HOSPADM

## 2019-12-05 NOTE — DISCHARGE INSTRUCTIONS
[facility]  Dr Dhara Loera   [address] [facility], [address]  Phone: [phone]     Postoperative Instructions for Outpatient Surgery     These instructions are being provided by your doctor to give you basic guidelines during your post-op recovery  Please let our office know if your contact information has changed       Please call the office today for an appointment in 5-7 days for suture removal      Dressings: Remove gauze tomorrow afternoon  Then, apply bacitracin ointment 3 times daily for 3 days then, stop       Activity Restrictions: Nothing strenuous for 48 hours       Bathing:  May shower tomorrow afternoon  Pat incision dry       Medications:    Resume pre-op medications  You may take tylenol, aleve, or ibuprofen for pain control                 Other: Elevate head of bed at night to sleep over the next 48 hours

## 2019-12-05 NOTE — INTERIM OP NOTE
RE-EXCISION ATYPICAL MELANOCYTIC LESION (6 O'CLOCK MARGIN) OF NOSE, COMPLEX CLOSURE 6 O'CLOCK MARGIN OF NOSE  Postoperative Note  PATIENT NAME: Mariano Zheng  : 1945  MRN: [id number]  [facility] ROOM 01    Surgery Date: 2019    Preop Diagnosis:  Disorder of pigmentation, unspecified [L81 9]  Atypical pigmented skin lesion [L81 9]    Post-Op Diagnosis Codes:     * Disorder of pigmentation, unspecified [L81 9]     * Atypical pigmented skin lesion [L81 9]    Procedure(s) (LRB):  RE-EXCISION ATYPICAL MELANOCYTIC LESION (6 O'CLOCK MARGIN) OF NOSE (N/A)  COMPLEX CLOSURE 6 O'CLOCK MARGIN OF NOSE (N/A)    Surgeon(s) and Role:     Jasmin Craig MD - Primary    Specimens:  * No specimens in log *    Estimated Blood Loss:   Minimal    Anesthesia Type:   Local     Findings:    None  Complications:   None    SIGNATURE: Dwayne Parra PA-C   DATE: 2019   TIME: 8:27 AM

## 2019-12-05 NOTE — OP NOTE
OPERATIVE REPORT  PATIENT NAME: Neida Mcclelland    :  1945  MRN: [id number]  [facility]    SURGERY DATE: 2019    Surgeon(s) and Role:     Giorgi Squires MD - Primary    Preop Diagnosis:  Disorder of pigmentation, unspecified [L81 9]  Atypical pigmented skin lesion [L81 9]    Post-Op Diagnosis Codes:     * Disorder of pigmentation, unspecified [L81 9]     * Atypical pigmented skin lesion [L81 9]    Procedure:  1  Re-excision atypical lymphocytic lesion of nose 4-6 to 08:00 o'clock margin (1 1 cm excised diameter) 2  Adjacent tissue transfer/local flap reconstruction defect of nose 1 1 x 1 0 cm   Specimen(s):  ID Type Source Tests Collected by Time Destination   1 : Re-Excision Atypical Melanocytic Lesion of the Nose 4-6-8 O'clock margin Suture oswaldo new 6 O'Clock peripheral margin  Tissue Nose TISSUE Nick Contreras MD 2019 [id number]        Estimated Blood Loss:   Minimal    Drains:  * No LDAs found *    Anesthesia Type:   Local    Operative Indications:  Disorder of pigmentation, unspecified [L81 9]  Atypical pigmented skin lesion [L81 9]      Operative Findings:  As above    Complications:   None    Procedure and Technique:  Maegan Phillip was seen preoperatively in the holding area, the surgical site was marked with her participation  We reviewed the planned procedure as well as potential risks, complications limitations  The area was prepped and draped in sterile fashion and infiltrated with xylocaine with epinephrine after a proper time-out had been performed  2 5 loupe magnification was used to aid in visualization  The area of concern was marked to be excised, this was determined to be 4-6 to 08:00 o'clock margin  Excision was completed with a 15 blade and curved iris scissors  The specimen was marked with suture and sent to pathology  In order to close the wound without significant tension, advancement flap was designed distal to the defect    The incisions were made with 15 blade flap was elevated deep to the dermis around the entire circumference of the wound  After adequate flap elevation had been performed hemostasis was assured with bipolar cautery  Flap was then advanced to fill the defect and closure was accomplished with 5 O Vicryl sutures buried at the level of the deep dermis this was followed by multiple interrupted 6 0 nylon skin sutures  Bacitracin and Xeroform were applied     I was present for the entire procedure    Patient Disposition:  PACU     SIGNATURE: Luciano Tran MD  DATE: December 5, 2019  TIME: 8:54 AM

## 2019-12-12 ENCOUNTER — OFFICE VISIT (OUTPATIENT)
Dept: PLASTIC SURGERY | Facility: CLINIC | Age: 74
End: 2019-12-12

## 2019-12-12 DIAGNOSIS — Z98.890 POST-OPERATIVE STATE: Primary | ICD-10-CM

## 2019-12-12 PROCEDURE — 99024 POSTOP FOLLOW-UP VISIT: CPT | Performed by: SURGERY

## 2019-12-12 NOTE — PROGRESS NOTES
Ese Rogers is a 69 y o  Female status pos1  Re-excision atypical lymphocytic lesion of nose 4-6 to 08:00 o'clock margin (1 1 cm excised diameter) 2  Adjacent tissue transfer/local flap reconstruction defect of nose 1 1 x 1 0 cm done 12/5/19  Patient in the office for suture removal  Sutures removed  Discussed silicone scar gel  Pathology is still in process  Patient will schedule an appointment after pathology results  photographs taken

## 2019-12-20 ENCOUNTER — TELEPHONE (OUTPATIENT)
Dept: PLASTIC SURGERY | Facility: CLINIC | Age: 74
End: 2019-12-20

## 2024-09-27 ENCOUNTER — HOSPITAL ENCOUNTER (OUTPATIENT)
Dept: RADIOLOGY | Facility: HOSPITAL | Age: 79
End: 2024-09-27
Payer: MEDICARE

## 2024-09-27 DIAGNOSIS — R50.9 HYPERTHERMIA-INDUCED DEFECT: ICD-10-CM

## 2024-09-27 PROCEDURE — 71046 X-RAY EXAM CHEST 2 VIEWS: CPT

## 2025-08-11 ENCOUNTER — OFFICE VISIT (OUTPATIENT)
Age: 80
End: 2025-08-11
Payer: MEDICARE

## (undated) DEVICE — GLOVE SRG BIOGEL 7

## (undated) DEVICE — PAD GROUNDING ADULT

## (undated) DEVICE — BETHLEHEM UNIVERSAL OUTPATIENT: Brand: CARDINAL HEALTH

## (undated) DEVICE — INTENDED FOR TISSUE SEPARATION, AND OTHER PROCEDURES THAT REQUIRE A SHARP SURGICAL BLADE TO PUNCTURE OR CUT.: Brand: BARD-PARKER SAFETY BLADES SIZE 15, STERILE

## (undated) DEVICE — BIPOLAR CORD DISP

## (undated) DEVICE — SUT ETHILON 6-0 P-3 18 IN 1698G

## (undated) DEVICE — DRESSING SILON DUAL-DRESS 50 FOAM 5.5 X 6 IN

## (undated) DEVICE — ELECTRODE NEEDLE MEGAFINE 2IN E-Z CLEAN MEGADYNE -0118

## (undated) DEVICE — NEEDLE 27 G X 1 1/4

## (undated) DEVICE — OCCLUSIVE GAUZE STRIP,3% BISMUTH TRIBROMOPHENATE IN PETROLATUM BLEND: Brand: XEROFORM

## (undated) DEVICE — SUT MONOCRYL 5-0 P-3 18 IN Y493G

## (undated) DEVICE — SUT VICRYL 5-0 P-1 18 IN J490G

## (undated) DEVICE — SUT SILK 5-0 P-3 18 IN 640G

## (undated) DEVICE — SKIN MARKER DUAL TIP WITH RULER CAP, FLEXIBLE RULER AND LABELS: Brand: DEVON

## (undated) DEVICE — SUT CHROMIC 6-0 G-1 18 IN 796G

## (undated) DEVICE — INTENDED FOR TISSUE SEPARATION, AND OTHER PROCEDURES THAT REQUIRE A SHARP SURGICAL BLADE TO PUNCTURE OR CUT.: Brand: BARD-PARKER ® CARBON RIB-BACK BLADES

## (undated) DEVICE — VESSEL CANNULA

## (undated) DEVICE — TUBING SUCTION 5MM X 12 FT

## (undated) DEVICE — 3M™ STERI-STRIP™ REINFORCED ADHESIVE SKIN CLOSURES, R1547, 1/2 IN X 4 IN (12 MM X 100 MM), 6 STRIPS/ENVELOPE: Brand: 3M™ STERI-STRIP™

## (undated) DEVICE — TIBURON SPLIT SHEET: Brand: CONVERTORS

## (undated) DEVICE — 3M™ STERI-STRIP™ COMPOUND BENZOIN TINCTURE 40 BAGS/CARTON 4 CARTONS/CASE C1544: Brand: 3M™ STERI-STRIP™

## (undated) DEVICE — POV-IOD SOLUTION 4OZ BT

## (undated) DEVICE — GAUZE SPONGES,16 PLY: Brand: CURITY